# Patient Record
Sex: FEMALE | Race: WHITE | Employment: FULL TIME | URBAN - METROPOLITAN AREA
[De-identification: names, ages, dates, MRNs, and addresses within clinical notes are randomized per-mention and may not be internally consistent; named-entity substitution may affect disease eponyms.]

---

## 2018-01-02 PROBLEM — I63.9 CRYPTOGENIC STROKE (HCC): Status: ACTIVE | Noted: 2018-01-02

## 2019-09-06 VITALS — HEIGHT: 66 IN | BODY MASS INDEX: 47.09 KG/M2 | WEIGHT: 293 LBS

## 2019-09-06 RX ORDER — BUPROPION HYDROCHLORIDE 300 MG/1
300 TABLET ORAL DAILY
COMMUNITY

## 2019-09-09 ENCOUNTER — HOSPITAL ENCOUNTER (OUTPATIENT)
Dept: INTERVENTIONAL RADIOLOGY/VASCULAR | Facility: HOSPITAL | Age: 44
Discharge: HOME | End: 2019-09-09
Attending: INTERNAL MEDICINE | Admitting: INTERNAL MEDICINE
Payer: COMMERCIAL

## 2019-09-09 DIAGNOSIS — Z98.890 HISTORY OF LOOP RECORDER: ICD-10-CM

## 2019-09-09 PROCEDURE — 33286 RMVL SUBQ CAR RHYTHM MNTR: CPT

## 2019-09-09 PROCEDURE — 0JPT32Z REMOVAL OF MONITORING DEVICE FROM TRUNK SUBCUTANEOUS TISSUE AND FASCIA, PERCUTANEOUS APPROACH: ICD-10-PCS | Performed by: INTERNAL MEDICINE

## 2019-09-09 NOTE — PROGRESS NOTES
S/p linq removal, left chest with out complications, sutured closed. mepilex inplace, c/d/i. Dc instrucions reviewed.  Pt dc home in stable condition

## 2019-09-28 NOTE — H&P
Pre-op Diagnosis: * No pre-op diagnosis entered *    The above referenced H&P was reviewed by Erika Dumont MD on 9/28/2019, the patient was examined and no significant changes have occurred in the patient's condition since the H&P was performed.   I

## 2019-09-28 NOTE — PROCEDURES
Loop Recorder Removal      History:  38year old female with cryptogenic stroke s/p LINQ now at [facility] who presents for a loop recorder removal. The risks and benefits of the procedure (including, but not limited to, hematoma and infection) were discussed.  Floreen Babinski

## 2021-01-04 ENCOUNTER — LAB ENCOUNTER (OUTPATIENT)
Dept: LAB | Facility: HOSPITAL | Age: 46
End: 2021-01-04
Attending: PATHOLOGY

## 2021-01-04 DIAGNOSIS — R69 UNDIAGNOSED DISEASE OR SYNDROME PRESENT: Primary | ICD-10-CM

## 2021-01-04 LAB
HAV IGM SER QL: NONREACTIVE
HBV SURFACE AG SER-ACNC: <0.1 [IU]/L
HBV SURFACE AG SERPL QL IA: NONREACTIVE
HCV AB SERPL QL IA: NONREACTIVE

## 2021-01-04 PROCEDURE — 86790 VIRUS ANTIBODY NOS: CPT

## 2021-01-04 PROCEDURE — 87491 CHLMYD TRACH DNA AMP PROBE: CPT

## 2021-01-04 PROCEDURE — 86803 HEPATITIS C AB TEST: CPT

## 2021-01-04 PROCEDURE — 86709 HEPATITIS A IGM ANTIBODY: CPT

## 2021-01-04 PROCEDURE — 36415 COLL VENOUS BLD VENIPUNCTURE: CPT

## 2021-01-04 PROCEDURE — 87591 N.GONORRHOEAE DNA AMP PROB: CPT

## 2021-01-04 PROCEDURE — 87389 HIV-1 AG W/HIV-1&-2 AB AG IA: CPT

## 2021-01-04 PROCEDURE — 86592 SYPHILIS TEST NON-TREP QUAL: CPT

## 2021-01-04 PROCEDURE — 87340 HEPATITIS B SURFACE AG IA: CPT

## 2021-01-04 PROCEDURE — 87529 HSV DNA AMP PROBE: CPT

## 2021-01-06 LAB
C TRACH DNA SPEC QL NAA+PROBE: NEGATIVE
N GONORRHOEA DNA SPEC QL NAA+PROBE: NEGATIVE
RPR SER QL: NONREACTIVE

## 2021-01-10 LAB — HERPES SIMPLEX VIRUS BY PCR: NOT DETECTED

## (undated) NOTE — LETTER
[facility] [address], [address]  Consent for Procedure/Sedation    Date:        Time:       1. I authorize the performance upon Lear Gins the following:  Removal of Loop Recorder Device      2.  I authorize Dr. Lev Grady (an ________________________________    ___________________    Witness: _________________________      Date: ___________________    Printed: 2019   11:06 AM  Patient Name: Manny Brantley        : 1975       Medical Record #: [id number]